# Patient Record
Sex: FEMALE | Race: WHITE | NOT HISPANIC OR LATINO | Employment: OTHER | URBAN - METROPOLITAN AREA
[De-identification: names, ages, dates, MRNs, and addresses within clinical notes are randomized per-mention and may not be internally consistent; named-entity substitution may affect disease eponyms.]

---

## 2021-01-22 ENCOUNTER — TELEPHONE (OUTPATIENT)
Dept: ENDOCRINOLOGY | Facility: CLINIC | Age: 51
End: 2021-01-22

## 2021-01-22 NOTE — TELEPHONE ENCOUNTER
Approvedon January 21  Your PA request has been approved. Additional information will be provided in the approval communication. (Message 1143)  Drug  Synthroid 50MCG tablets

## 2021-02-17 ENCOUNTER — LAB (OUTPATIENT)
Dept: LAB | Facility: HOSPITAL | Age: 51
End: 2021-02-17

## 2021-02-17 ENCOUNTER — OFFICE VISIT (OUTPATIENT)
Dept: ENDOCRINOLOGY | Facility: CLINIC | Age: 51
End: 2021-02-17

## 2021-02-17 VITALS
HEIGHT: 62 IN | WEIGHT: 111 LBS | RESPIRATION RATE: 20 BRPM | HEART RATE: 81 BPM | BODY MASS INDEX: 20.43 KG/M2 | TEMPERATURE: 98 F | OXYGEN SATURATION: 97 % | SYSTOLIC BLOOD PRESSURE: 110 MMHG | DIASTOLIC BLOOD PRESSURE: 62 MMHG

## 2021-02-17 DIAGNOSIS — E03.9 HYPOTHYROIDISM (ACQUIRED): ICD-10-CM

## 2021-02-17 DIAGNOSIS — E04.9 NODULAR GOITER: Primary | ICD-10-CM

## 2021-02-17 LAB — TSH SERPL DL<=0.05 MIU/L-ACNC: 1.58 UIU/ML (ref 0.27–4.2)

## 2021-02-17 PROCEDURE — 84443 ASSAY THYROID STIM HORMONE: CPT

## 2021-02-17 PROCEDURE — 99213 OFFICE O/P EST LOW 20 MIN: CPT | Performed by: INTERNAL MEDICINE

## 2021-02-17 RX ORDER — LEVOTHYROXINE SODIUM 50 MCG
50 TABLET ORAL DAILY
Qty: 90 TABLET | Refills: 3 | Status: SHIPPED | OUTPATIENT
Start: 2021-02-17 | End: 2022-02-21 | Stop reason: SDUPTHER

## 2021-02-17 RX ORDER — LEVOTHYROXINE SODIUM 50 MCG
50 TABLET ORAL DAILY
COMMUNITY
Start: 2020-12-04 | End: 2021-02-17 | Stop reason: SDUPTHER

## 2021-02-17 NOTE — PROGRESS NOTES
"     Office Note      Date: 2021  Patient Name: Concetta Davey  MRN: [id number]  : 1970    Chief Complaint   Patient presents with   • Hypothyroidism   • Goiter       History of Present Illness:   Concetat Davey is a 50 y.o. female who presents for Hypothyroidism and Goiter    She remains on synthroid 50mcg qd. She is taking this correctly. She isn't taking any interfering meds concurrently. She hasn't noted any change in the size of her neck. She notes occ trouble swallowing, especially with meat. She has noted occ hoarseness. She denies any sxs of hypo- or hyperthyroidism.     Nodular goiter - benign FNA ; 10/16    Subjective      Review of Systems:   Review of Systems   Constitutional: Negative.    HENT: Positive for trouble swallowing.    Cardiovascular: Negative.    Gastrointestinal: Negative.    Endocrine: Negative.        The following portions of the patient's history were reviewed and updated as appropriate: allergies, current medications, past family history, past medical history, past social history, past surgical history and problem list.    Objective     Visit Vitals  /62   Pulse 81   Temp 98 °F (36.7 °C)   Resp 20   Ht 157.5 cm (62\")   Wt 50.3 kg (111 lb)   SpO2 97%   BMI 20.30 kg/m²       Physical Exam:  Physical Exam  Constitutional:       Appearance: Normal appearance.   Neck:      Thyroid: Thyroid mass and thyromegaly present. No thyroid tenderness.      Comments: ~4cm enlargement in right thyroid lobe - freely movable  Lymphadenopathy:      Cervical: No cervical adenopathy.   Neurological:      Mental Status: She is alert.         Labs:    TSH  No results found for: TSHBASE     Free T4  No results found for: FREET4    T3  No results found for: A8CNRWT      TPO  No results found for: THYROIDAB    TG AB  No results found for: THGAB    TG  No results found for: THYROGLB    CBC w/DIFF  No results found for: WBC, RBC, HGB, HCT, MCV, MCH, MCHC, RDW, RDWSD, MPV, PLT, NEUTRORELPCT, " LYMPHORELPCT, MONORELPCT, EOSRELPCT, BASORELPCT, AUTOIGPER, NEUTROABS, LYMPHSABS, MONOSABS, EOSABS, BASOSABS, AUTOIGNUM, NRBC        Assessment / Plan      Assessment & Plan:  Diagnoses and all orders for this visit:    1. Nodular goiter (Primary)  Assessment & Plan:  Goiter stable to palpation.  Plan for neck u/s in a year.      2. Hypothyroidism (acquired)  Assessment & Plan:  Check TSH.    Orders:  -     TSH; Future    Other orders  -     Synthroid 50 MCG tablet; Take 1 tablet by mouth Daily.  Dispense: 90 tablet; Refill: 3      Return in about 1 year (around 2/17/2022) for Recheck with TSH and neck u/s.    Armin Maya MD   02/17/2021

## 2022-02-21 ENCOUNTER — LAB (OUTPATIENT)
Dept: LAB | Facility: HOSPITAL | Age: 52
End: 2022-02-21

## 2022-02-21 ENCOUNTER — OFFICE VISIT (OUTPATIENT)
Dept: ENDOCRINOLOGY | Facility: CLINIC | Age: 52
End: 2022-02-21

## 2022-02-21 VITALS
HEIGHT: 62 IN | BODY MASS INDEX: 20.06 KG/M2 | DIASTOLIC BLOOD PRESSURE: 75 MMHG | WEIGHT: 109 LBS | SYSTOLIC BLOOD PRESSURE: 114 MMHG | HEART RATE: 91 BPM | OXYGEN SATURATION: 97 %

## 2022-02-21 DIAGNOSIS — E03.9 HYPOTHYROIDISM (ACQUIRED): ICD-10-CM

## 2022-02-21 DIAGNOSIS — E04.9 NODULAR GOITER: ICD-10-CM

## 2022-02-21 DIAGNOSIS — E03.9 HYPOTHYROIDISM (ACQUIRED): Primary | ICD-10-CM

## 2022-02-21 PROCEDURE — 84443 ASSAY THYROID STIM HORMONE: CPT

## 2022-02-21 PROCEDURE — 99213 OFFICE O/P EST LOW 20 MIN: CPT | Performed by: INTERNAL MEDICINE

## 2022-02-21 PROCEDURE — 76536 US EXAM OF HEAD AND NECK: CPT | Performed by: INTERNAL MEDICINE

## 2022-02-21 RX ORDER — LEVOTHYROXINE SODIUM 50 MCG
50 TABLET ORAL DAILY
Qty: 90 TABLET | Refills: 3 | Status: SHIPPED | OUTPATIENT
Start: 2022-02-21 | End: 2023-03-13 | Stop reason: SDUPTHER

## 2022-02-21 NOTE — ASSESSMENT & PLAN NOTE
A neck u/s was performed today.  This revealed a large mixed cystic-solid nodule in the right thyroid lobe that was 3.8cm in size.  There was a subcentimeter solid hypoechoic nodule in the left lobe that was 0.8cm.  These are stable compared to u/s from 2/18/2020.  No abnormal lymph nodes were seen.    Plan for another u/s in 2 years.

## 2022-02-21 NOTE — PROGRESS NOTES
"     Office Note      Date: 2022  Patient Name: Concetta Davey  MRN: [id number]  : 1970    Chief Complaint   Patient presents with   • Goiter       History of Present Illness:   Concetta Davey is a 51 y.o. female who presents for Goiter    She remains on synthroid 50mcg qd. She is taking this correctly. She isn't taking any interfering meds concurrently. She hasn't noted any change in the size of her neck. She notes occ trouble swallowing, especially with meat. She has noted occ hoarseness. She denies any sxs of hypo- or hyperthyroidism.      Nodular goiter - benign FNA ; 10/16    Subjective      Review of Systems:   Review of Systems   Constitutional: Negative.    Cardiovascular: Negative.    Gastrointestinal: Negative.    Endocrine: Negative.        The following portions of the patient's history were reviewed and updated as appropriate: allergies, current medications, past family history, past medical history, past social history, past surgical history and problem list.    Objective     Visit Vitals  /75   Pulse 91   Ht 157.5 cm (62\")   Wt 49.4 kg (109 lb)   SpO2 97%   BMI 19.94 kg/m²       Physical Exam:  Physical Exam  Constitutional:       Appearance: Normal appearance.   Neurological:      Mental Status: She is alert.         Labs:    TSH  No results found for: TSHBASE     Free T4  No results found for: FREET4    T3  No results found for: [id number]      TPO  No results found for: THYROIDAB    TG AB  No results found for: THGAB    TG  No results found for: THYROGLB    CBC w/DIFF  No results found for: WBC, RBC, HGB, HCT, MCV, MCH, MCHC, RDW, RDWSD, MPV, PLT, NEUTRORELPCT, LYMPHORELPCT, MONORELPCT, EOSRELPCT, BASORELPCT, AUTOIGPER, NEUTROABS, LYMPHSABS, MONOSABS, EOSABS, BASOSABS, AUTOIGNUM, NRBC        Assessment / Plan      Assessment & Plan:  Diagnoses and all orders for this visit:    1. Hypothyroidism (acquired) (Primary)  Assessment & Plan:  Continue T4.  Check TSH today.    Orders:  - "     TSH; Future    2. Nodular goiter  Assessment & Plan:  A neck u/s was performed today.  This revealed a large mixed cystic-solid nodule in the right thyroid lobe that was 3.8cm in size.  There was a subcentimeter solid hypoechoic nodule in the left lobe that was 0.8cm.  These are stable compared to u/s from 2/18/2020.  No abnormal lymph nodes were seen.    Plan for another u/s in 2 years.    Orders:  -     US Thyroid    Other orders  -     Synthroid 50 MCG tablet; Take 1 tablet by mouth Daily.  Dispense: 90 tablet; Refill: 3      Return in about 1 year (around 2/21/2023) for Recheck with TSH.    Armin Maya MD   02/21/2022

## 2022-02-22 LAB — TSH SERPL DL<=0.05 MIU/L-ACNC: 1.09 UIU/ML (ref 0.27–4.2)

## 2023-03-13 RX ORDER — LEVOTHYROXINE SODIUM 50 MCG
50 TABLET ORAL DAILY
Qty: 90 TABLET | Refills: 3 | Status: SHIPPED | OUTPATIENT
Start: 2023-03-13

## 2023-09-12 ENCOUNTER — OFFICE VISIT (OUTPATIENT)
Dept: FAMILY MEDICINE CLINIC | Facility: CLINIC | Age: 53
End: 2023-09-12
Payer: COMMERCIAL

## 2023-09-12 VITALS
WEIGHT: 109.2 LBS | HEIGHT: 62 IN | BODY MASS INDEX: 20.09 KG/M2 | DIASTOLIC BLOOD PRESSURE: 68 MMHG | RESPIRATION RATE: 15 BRPM | SYSTOLIC BLOOD PRESSURE: 110 MMHG | TEMPERATURE: 98 F | HEART RATE: 83 BPM | OXYGEN SATURATION: 99 %

## 2023-09-12 DIAGNOSIS — M54.2 CERVICALGIA: ICD-10-CM

## 2023-09-12 DIAGNOSIS — Z12.2 ENCOUNTER FOR SCREENING FOR LUNG CANCER: ICD-10-CM

## 2023-09-12 DIAGNOSIS — C81.98 HODGKIN LYMPHOMA OF LYMPH NODES OF MULTIPLE REGIONS, UNSPECIFIED HODGKIN LYMPHOMA TYPE: ICD-10-CM

## 2023-09-12 DIAGNOSIS — E04.9 NODULAR GOITER: ICD-10-CM

## 2023-09-12 DIAGNOSIS — Z87.891 PERSONAL HISTORY OF TOBACCO USE: ICD-10-CM

## 2023-09-12 DIAGNOSIS — E03.9 HYPOTHYROIDISM (ACQUIRED): ICD-10-CM

## 2023-09-12 DIAGNOSIS — Z12.31 SCREENING MAMMOGRAM FOR BREAST CANCER: ICD-10-CM

## 2023-09-12 DIAGNOSIS — E55.9 VITAMIN D DEFICIENCY: ICD-10-CM

## 2023-09-12 DIAGNOSIS — C81.91 HODGKIN LYMPHOMA OF LYMPH NODES OF NECK, UNSPECIFIED HODGKIN LYMPHOMA TYPE: ICD-10-CM

## 2023-09-12 DIAGNOSIS — Z12.11 SCREENING FOR COLON CANCER: ICD-10-CM

## 2023-09-12 DIAGNOSIS — G24.8 ABDOMINAL WALL DYSTONIA: ICD-10-CM

## 2023-09-12 DIAGNOSIS — Z13.1 DIABETES MELLITUS SCREENING: ICD-10-CM

## 2023-09-12 DIAGNOSIS — Z00.00 GENERAL MEDICAL EXAM: Primary | ICD-10-CM

## 2023-09-12 NOTE — ASSESSMENT & PLAN NOTE
Discussed together health maintenance and screening along with vaccination options and healthy diet and exercise habits as part of the preventative counseling at their physical exam today.     Declines all vaccination updates.  We did discuss flu, COVID, Tdap, Shingrix, and Prevnar 20.    Discussed and encouraged low-dose lung CT.  She voiced understanding and declines.  We will continue to encourage low-dose lung CT and smoking cessation in the future.  She declines smoking cessation.    Up-to-date with Pap smear and mammogram.    Awaiting nonfasting labs.

## 2023-09-12 NOTE — ASSESSMENT & PLAN NOTE
Continue Synthroid.  Awaiting recheck on thyroid studies.  She has a follow-up with endocrinology next week

## 2023-09-12 NOTE — LETTER
September 12, 2023     Patient: Concetta Davey   YOB: 1970   Date of Visit: 9/12/2023       Dx chronic neck pain and limitation with leftward movement    Order for physical therapy up to 3 times/week for 6 wks.  Please evaluate and treat.          Sincerely,        Mesfin Claros MD

## 2023-09-12 NOTE — ASSESSMENT & PLAN NOTE
Released after 20 years of surveillance with oncology.  Encouraged smoking cessation.  Encourage low-dose lung CT.  Awaiting nonfasting labs

## 2023-09-12 NOTE — PROGRESS NOTES
Chief Complaint  Follow-up (Patient is here for blood work and neck pain )    Subjective    History of Present Illness:  Concetta Davey is a 53 y.o. female who presents today for physical exam and to reestablish care together.    It has been over 3 years since her last visit and she is interested in getting blood work up-to-date.  Specifically she would like CBC done given her history of Hodgkin's lymphoma.  She did follow yearly with oncology for over 20 years and he released her as long as she would continue to get a yearly CBC completed.    She is up-to-date with [facility] with her mammogram in September 2022 and is scheduled in October 2023 for her yearly mammogram.  Pap smear up-to-date with gynecology in May 2023.    Follow-up ongoing with endocrinology given history of nodular goiter on the right side of her thyroid and hypothyroidism.  She would like thyroid labs done today given she has an appointment with endocrinology scheduled for next week.    She does continue to see pain management after Dr. Bethea left [facility] gastroenterology and is getting abdominal wall injections with Botox given problems with abdominal wall dystonia.    She is past due for colon cancer screening and is willing to get Cologuard done but declines colonoscopy.    Discussed and recommended lung cancer screening given her 32-pack-year history of smoking.  She voiced understanding but at this time declines all cancer screening.    She is having recurrent problems with neck pain and has had further work-up and treatment with pain management and physical therapy in the past.  She is interested in getting a neck x-ray up-to-date and also in order to restart physical therapy.    History of low vitamin D and she would like vitamin D level checked with her labs today.        Objective   Vital Signs:   /68 (BP Location: Right arm, Patient Position: Sitting, Cuff Size: Adult)   Pulse 83   Temp 98 °F (36.7 °C)  "(Temporal)   Resp 15   Ht 157.5 cm (62\")   Wt 49.5 kg (109 lb 3.2 oz)   SpO2 99%   BMI 19.97 kg/m²     Review of Systems   Constitutional:  Negative for appetite change, chills and fever.   HENT:  Negative for hearing loss.    Eyes:  Negative for blurred vision.   Respiratory:  Negative for chest tightness.    Cardiovascular:  Negative for chest pain.   Gastrointestinal:  Positive for abdominal pain.        See HPI.  Well-controlled with abdominal Botox injections   Musculoskeletal:  Positive for arthralgias, neck pain and neck stiffness. Negative for gait problem.   Skin:  Negative for rash.   Psychiatric/Behavioral:  Negative for depressed mood.      Past History:  Medical History: has a past medical history of Non-toxic multinodular goiter and Postoperative hypothyroidism.   Surgical History: has no past surgical history on file.   Family History: family history includes Arthritis in her mother; Atrial fibrillation in her mother; COPD in her father; Hypertension in her father and mother.   Social History: reports that she has been smoking cigarettes. She has a 32.00 pack-year smoking history. She has never used smokeless tobacco. She reports that she does not drink alcohol and does not use drugs.      Current Outpatient Medications:     Cholecalciferol (Vitamin D3) 25 MCG (1000 UT) capsule, Take 1 capsule by mouth Daily., Disp: , Rfl:     Synthroid 50 MCG tablet, Take 1 tablet by mouth Daily., Disp: 90 tablet, Rfl: 3    Allergies: Contrast dye (echo or unknown ct/mr) and Flagyl [metronidazole]    Physical Exam  Constitutional:       Appearance: Normal appearance.   HENT:      Head: Normocephalic.      Right Ear: Tympanic membrane, ear canal and external ear normal. There is no impacted cerumen.      Left Ear: Tympanic membrane, ear canal and external ear normal. There is no impacted cerumen.      Nose: Nose normal.      Mouth/Throat:      Mouth: Mucous membranes are moist.      Pharynx: Oropharynx is clear. "   Eyes:      Extraocular Movements: Extraocular movements intact.      Conjunctiva/sclera: Conjunctivae normal.      Pupils: Pupils are equal, round, and reactive to light.   Neck:      Comments: Right thyroid enlargement consistent with her history of nodular goiter.  Ongoing follow-up with endocrinology and they do continue to monitor with ultrasound imaging given her history of Hodgkin's lymphoma  Cardiovascular:      Rate and Rhythm: Normal rate and regular rhythm.      Heart sounds: Normal heart sounds.   Pulmonary:      Effort: Pulmonary effort is normal.      Breath sounds: Normal breath sounds.   Abdominal:      General: Abdomen is flat. Bowel sounds are normal. There is no distension.      Palpations: Abdomen is soft. There is no mass.      Tenderness: There is no abdominal tenderness.   Musculoskeletal:      Comments: Mild cervical crepitus and decreased left forward movement.  Interested in x-ray and physical therapy order   Lymphadenopathy:      Cervical: No cervical adenopathy.   Skin:     General: Skin is warm and dry.   Neurological:      General: No focal deficit present.      Mental Status: She is alert.   Psychiatric:         Mood and Affect: Mood normal.         Behavior: Behavior normal.         Thought Content: Thought content normal.        Result Review                   Assessment and Plan  Diagnoses and all orders for this visit:    1. General medical exam (Primary)  Assessment & Plan:  Discussed together health maintenance and screening along with vaccination options and healthy diet and exercise habits as part of the preventative counseling at their physical exam today.     Declines all vaccination updates.  We did discuss flu, COVID, Tdap, Shingrix, and Prevnar 20.    Discussed and encouraged low-dose lung CT.  She voiced understanding and declines.  We will continue to encourage low-dose lung CT and smoking cessation in the future.  She declines smoking cessation.    Up-to-date with Pap  smear and mammogram.    Awaiting nonfasting labs.    Orders:  -     CBC Auto Differential; Future  -     Comprehensive Metabolic Panel; Future  -     Lipid Panel; Future  -     TSH; Future  -     T4, Free; Future  -     CBC Auto Differential  -     Comprehensive Metabolic Panel  -     Lipid Panel  -     TSH  -     T4, Free    2. Diabetes mellitus screening  -     Hemoglobin A1c; Future  -     Hemoglobin A1c    3. Hodgkin lymphoma of lymph nodes of neck, unspecified Hodgkin lymphoma type  Assessment & Plan:  Released after 20 years of surveillance with oncology.  Encouraged smoking cessation.  Encourage low-dose lung CT.  Awaiting nonfasting labs    Orders:  -     CBC Auto Differential; Future  -     Comprehensive Metabolic Panel; Future  -     CBC Auto Differential  -     Comprehensive Metabolic Panel    4. Screening mammogram for breast cancer  Assessment & Plan:  Uptodate sept 2022 at [facility].  Scheduled 10/1/23 with [facility].       5. Screening for colon cancer  Assessment & Plan:  Declines colonoscopy.  Ordered Cologuard    Orders:  -     Cologuard - Stool, Per Rectum; Future    6. Abdominal wall dystonia  Assessment & Plan:  Botox injections with pain management at [facility]      7. Vitamin D deficiency  -     Vitamin D,25-Hydroxy; Future  -     Vitamin D,25-Hydroxy    8. Hypothyroidism (acquired)  Assessment & Plan:  Continue Synthroid.  Awaiting recheck on thyroid studies.  She has a follow-up with endocrinology next week    Orders:  -     CBC Auto Differential; Future  -     Comprehensive Metabolic Panel; Future  -     TSH; Future  -     T4, Free; Future  -     CBC Auto Differential  -     Comprehensive Metabolic Panel  -     TSH  -     T4, Free    9. Cervicalgia  Assessment & Plan:  Awaiting neck x-ray and given order to start physical therapy at [facility]    Orders:  -     XR Spine Cervical 2 or 3 View; Future    10. Personal history of tobacco use  Assessment & Plan:  Declines smoking  cessation.    Discussed and encouraged low-dose lung CT for lung cancer screening.  She voiced understanding and declines low-dose lung CT at this time      11. Encounter for screening for lung cancer  Assessment & Plan:  See above.  We will encourage this at follow-up      12. Nodular goiter  Assessment & Plan:  Follow-up ongoing with endocrinology      13. Hodgkin lymphoma of lymph nodes of multiple regions, unspecified Hodgkin lymphoma type  Assessment & Plan:  See above          BMI is within normal parameters. No other follow-up for BMI required.          Follow Up  Return in about 1 year (around 9/12/2024) for Annual physical, Fasting for labs at appointment (but drink water!).    Mesfin Claros MD

## 2023-09-12 NOTE — ASSESSMENT & PLAN NOTE
Declines smoking cessation.    Discussed and encouraged low-dose lung CT for lung cancer screening.  She voiced understanding and declines low-dose lung CT at this time

## 2023-09-13 LAB
25(OH)D3+25(OH)D2 SERPL-MCNC: 23.9 NG/ML (ref 30–100)
ALBUMIN SERPL-MCNC: 4.4 G/DL (ref 3.8–4.9)
ALBUMIN/GLOB SERPL: 1.6 {RATIO} (ref 1.2–2.2)
ALP SERPL-CCNC: 77 IU/L (ref 44–121)
ALT SERPL-CCNC: 7 IU/L (ref 0–32)
AST SERPL-CCNC: 12 IU/L (ref 0–40)
BASOPHILS # BLD AUTO: 0.1 X10E3/UL (ref 0–0.2)
BASOPHILS NFR BLD AUTO: 1 %
BILIRUB SERPL-MCNC: 0.3 MG/DL (ref 0–1.2)
BUN SERPL-MCNC: 14 MG/DL (ref 6–24)
BUN/CREAT SERPL: 18 (ref 9–23)
CALCIUM SERPL-MCNC: 9.1 MG/DL (ref 8.7–10.2)
CHLORIDE SERPL-SCNC: 103 MMOL/L (ref 96–106)
CHOLEST SERPL-MCNC: 204 MG/DL (ref 100–199)
CO2 SERPL-SCNC: 24 MMOL/L (ref 20–29)
CREAT SERPL-MCNC: 0.78 MG/DL (ref 0.57–1)
EGFRCR SERPLBLD CKD-EPI 2021: 91 ML/MIN/1.73
EOSINOPHIL # BLD AUTO: 0.2 X10E3/UL (ref 0–0.4)
EOSINOPHIL NFR BLD AUTO: 3 %
ERYTHROCYTE [DISTWIDTH] IN BLOOD BY AUTOMATED COUNT: 13.9 % (ref 11.7–15.4)
GLOBULIN SER CALC-MCNC: 2.8 G/DL (ref 1.5–4.5)
GLUCOSE SERPL-MCNC: 85 MG/DL (ref 70–99)
HBA1C MFR BLD: 5.6 % (ref 4.8–5.6)
HCT VFR BLD AUTO: 39.2 % (ref 34–46.6)
HDLC SERPL-MCNC: 78 MG/DL
HGB BLD-MCNC: 13 G/DL (ref 11.1–15.9)
IMM GRANULOCYTES # BLD AUTO: 0 X10E3/UL (ref 0–0.1)
IMM GRANULOCYTES NFR BLD AUTO: 0 %
LDLC SERPL CALC-MCNC: 107 MG/DL (ref 0–99)
LYMPHOCYTES # BLD AUTO: 2.2 X10E3/UL (ref 0.7–3.1)
LYMPHOCYTES NFR BLD AUTO: 27 %
MCH RBC QN AUTO: 29.8 PG (ref 26.6–33)
MCHC RBC AUTO-ENTMCNC: 33.2 G/DL (ref 31.5–35.7)
MCV RBC AUTO: 90 FL (ref 79–97)
MONOCYTES # BLD AUTO: 0.6 X10E3/UL (ref 0.1–0.9)
MONOCYTES NFR BLD AUTO: 8 %
NEUTROPHILS # BLD AUTO: 5.1 X10E3/UL (ref 1.4–7)
NEUTROPHILS NFR BLD AUTO: 61 %
PLATELET # BLD AUTO: 348 X10E3/UL (ref 150–450)
POTASSIUM SERPL-SCNC: 4.1 MMOL/L (ref 3.5–5.2)
PROT SERPL-MCNC: 7.2 G/DL (ref 6–8.5)
RBC # BLD AUTO: 4.36 X10E6/UL (ref 3.77–5.28)
SODIUM SERPL-SCNC: 142 MMOL/L (ref 134–144)
T4 FREE SERPL-MCNC: 1.26 NG/DL (ref 0.82–1.77)
TRIGL SERPL-MCNC: 107 MG/DL (ref 0–149)
TSH SERPL DL<=0.005 MIU/L-ACNC: 0.98 UIU/ML (ref 0.45–4.5)
VLDLC SERPL CALC-MCNC: 19 MG/DL (ref 5–40)
WBC # BLD AUTO: 8.3 X10E3/UL (ref 3.4–10.8)

## 2023-09-21 ENCOUNTER — OFFICE VISIT (OUTPATIENT)
Dept: ENDOCRINOLOGY | Facility: CLINIC | Age: 53
End: 2023-09-21
Payer: COMMERCIAL

## 2023-09-21 VITALS
OXYGEN SATURATION: 99 % | HEIGHT: 62 IN | DIASTOLIC BLOOD PRESSURE: 66 MMHG | BODY MASS INDEX: 19.88 KG/M2 | HEART RATE: 80 BPM | WEIGHT: 108 LBS | SYSTOLIC BLOOD PRESSURE: 112 MMHG

## 2023-09-21 DIAGNOSIS — E04.9 NODULAR GOITER: ICD-10-CM

## 2023-09-21 DIAGNOSIS — E03.9 HYPOTHYROIDISM (ACQUIRED): Primary | ICD-10-CM

## 2023-09-21 PROCEDURE — 99213 OFFICE O/P EST LOW 20 MIN: CPT | Performed by: INTERNAL MEDICINE

## 2023-09-21 NOTE — PROGRESS NOTES
"     Office Note      Date: 2023  Patient Name: Concetta Davey  MRN: [id number]  : 1970    Chief Complaint   Patient presents with    Hypothyroidism       History of Present Illness:   Concetta Davey is a 53 y.o. female who presents for Hypothyroidism    She remains on synthroid 50mcg qd. She is taking this correctly. She isn't taking any interfering meds concurrently. She hasn't noted any change in the size of her neck. She notes occ trouble swallowing, especially with meat. She has noted occ hoarseness. She denies any sxs of hypo- or hyperthyroidism.      Nodular goiter - benign FNA ; 10/16    Subjective      Review of Systems:   Review of Systems   Constitutional: Negative.    Cardiovascular: Negative.    Gastrointestinal: Negative.    Endocrine: Negative.      The following portions of the patient's history were reviewed and updated as appropriate: allergies, current medications, past family history, past medical history, past social history, past surgical history, and problem list.    Objective     Visit Vitals  /66   Pulse 80   Ht 157.5 cm (62\")   Wt 49 kg (108 lb)   SpO2 99%   BMI 19.75 kg/m²       Physical Exam:  Physical Exam  Constitutional:       Appearance: Normal appearance.   Neck:      Thyroid: Thyroid mass and thyromegaly present. No thyroid tenderness.      Comments: 4cm enlargement of right thyroid lobe - freely movable  Lymphadenopathy:      Cervical: No cervical adenopathy.   Neurological:      Mental Status: She is alert.       Labs:    TSH  No results found for: TSHBASE     Free T4  Free T4   Date Value Ref Range Status   2023 1.26 0.82 - 1.77 ng/dL Final       T3  No results found for: [id number]      TPO  No results found for: THYROIDAB    TG AB  No results found for: THGAB    TG  No results found for: THYROGLB    CBC w/DIFF  Lab Results   Component Value Date    WBC 8.3 2023    RBC 4.36 2023    HGB 13.0 2023    HCT 39.2 2023    MCV 90 " 09/12/2023    MCH 29.8 09/12/2023    MCHC 33.2 09/12/2023    RDW 13.9 09/12/2023     09/12/2023    NEUTRORELPCT 61 09/12/2023    LYMPHORELPCT 27 09/12/2023    MONORELPCT 8 09/12/2023    EOSRELPCT 3 09/12/2023    BASORELPCT 1 09/12/2023    NEUTROABS 5.1 09/12/2023    LYMPHSABS 2.2 09/12/2023    MONOSABS 0.6 09/12/2023    EOSABS 0.2 09/12/2023    BASOSABS 0.1 09/12/2023           Assessment / Plan      Assessment & Plan:  Diagnoses and all orders for this visit:    1. Hypothyroidism (acquired) (Primary)  Assessment & Plan:  Continue T4 tx.  Recent TSH was normal.      2. Nodular goiter  Assessment & Plan:  Plan for neck u/s in about 6 months.        Current Outpatient Medications   Medication Instructions    Synthroid 50 mcg, Oral, Daily    Vitamin D3 4,000 Units, Oral, Daily      Return in about 6 months (around 3/21/2024) for Recheck with TSH, neck u/s.    Armin Maya MD   09/21/2023

## 2024-03-04 RX ORDER — LEVOTHYROXINE SODIUM 50 MCG
50 TABLET ORAL DAILY
Qty: 90 TABLET | Refills: 3 | Status: SHIPPED | OUTPATIENT
Start: 2024-03-04

## 2024-03-04 NOTE — TELEPHONE ENCOUNTER
Rx Refill Note  Requested Prescriptions     Pending Prescriptions Disp Refills    Synthroid 50 MCG tablet 90 tablet 3     Sig: Take 1 tablet by mouth Daily.      Last office visit with prescribing clinician: 9/21/2023     Next office visit with prescribing clinician: 3/26/2024       Yareli Alicea MA  03/04/24, 13:44 EST

## 2024-03-26 ENCOUNTER — OFFICE VISIT (OUTPATIENT)
Dept: ENDOCRINOLOGY | Facility: CLINIC | Age: 54
End: 2024-03-26
Payer: COMMERCIAL

## 2024-03-26 VITALS
DIASTOLIC BLOOD PRESSURE: 60 MMHG | SYSTOLIC BLOOD PRESSURE: 106 MMHG | OXYGEN SATURATION: 97 % | HEART RATE: 90 BPM | HEIGHT: 62 IN | BODY MASS INDEX: 20.87 KG/M2 | WEIGHT: 113.4 LBS

## 2024-03-26 DIAGNOSIS — E03.9 HYPOTHYROIDISM (ACQUIRED): Primary | ICD-10-CM

## 2024-03-26 DIAGNOSIS — E04.9 NODULAR GOITER: ICD-10-CM

## 2024-03-26 DIAGNOSIS — E55.9 VITAMIN D DEFICIENCY: ICD-10-CM

## 2024-03-26 PROCEDURE — 76536 US EXAM OF HEAD AND NECK: CPT | Performed by: INTERNAL MEDICINE

## 2024-03-26 PROCEDURE — 84443 ASSAY THYROID STIM HORMONE: CPT | Performed by: INTERNAL MEDICINE

## 2024-03-26 PROCEDURE — 99214 OFFICE O/P EST MOD 30 MIN: CPT | Performed by: INTERNAL MEDICINE

## 2024-03-26 PROCEDURE — 82306 VITAMIN D 25 HYDROXY: CPT | Performed by: INTERNAL MEDICINE

## 2024-03-26 RX ORDER — CHOLECALCIFEROL (VITAMIN D3) 125 MCG
4000 CAPSULE ORAL DAILY
COMMUNITY

## 2024-03-26 NOTE — PROGRESS NOTES
"     Office Note      Date: 2024  Patient Name: Concetta Davey  MRN: [id number]  : 1970    Chief Complaint   Patient presents with    Hypothyroidism    Goiter     Ultrasound       History of Present Illness:   Concetta Davey is a 53 y.o. female who presents for Hypothyroidism and Goiter (Ultrasound)    She remains on synthroid 50mcg qd. She is taking this correctly. She isn't taking any interfering meds concurrently. She hasn't noted any change in the size of her neck. She notes occ trouble swallowing, especially with meat. She has noted occ hoarseness. She denies any sxs of hypo- or hyperthyroidism.      Nodular goiter - benign FNA ; 10/16    Subjective      Review of Systems:   Review of Systems   Constitutional: Negative.    Cardiovascular: Negative.    Gastrointestinal: Negative.    Endocrine: Negative.        The following portions of the patient's history were reviewed and updated as appropriate: allergies, current medications, past family history, past medical history, past social history, past surgical history, and problem list.    Objective     Visit Vitals  /60 (BP Location: Right arm, Patient Position: Sitting, Cuff Size: Adult)   Pulse 90   Ht 157.5 cm (62\")   Wt 51.4 kg (113 lb 6.4 oz)   SpO2 97%   BMI 20.74 kg/m²       Physical Exam:  Physical Exam  Constitutional:       Appearance: Normal appearance.   Neurological:      Mental Status: She is alert.         Labs:    TSH  No results found for: \"TSHBASE\"     Free T4  Free T4   Date Value Ref Range Status   2023 1.26 0.82 - 1.77 ng/dL Final       T3  No results found for: \"Y6CKAJE\"      TPO  No results found for: \"THYROIDAB\"    TG AB  No results found for: \"THGAB\"    TG  No results found for: \"THYROGLB\"    CBC w/DIFF  Lab Results   Component Value Date    WBC 8.3 2023    RBC 4.36 2023    HGB 13.0 2023    HCT 39.2 2023    MCV 90 2023    MCH 29.8 2023    MCHC 33.2 2023    RDW 13.9 " 09/12/2023     09/12/2023    NEUTRORELPCT 61 09/12/2023    LYMPHORELPCT 27 09/12/2023    MONORELPCT 8 09/12/2023    EOSRELPCT 3 09/12/2023    BASORELPCT 1 09/12/2023    NEUTROABS 5.1 09/12/2023    LYMPHSABS 2.2 09/12/2023    MONOSABS 0.6 09/12/2023    EOSABS 0.2 09/12/2023    BASOSABS 0.1 09/12/2023           Assessment / Plan      Assessment & Plan:  Diagnoses and all orders for this visit:    1. Hypothyroidism (acquired) (Primary)  Assessment & Plan:  Continue Synthroid.  Check TSH.    Orders:  -     TSH; Future    2. Nodular goiter  Assessment & Plan:  A neck u/s was performed today.  This revealed a mixed cystic-solid nodule in the right thyroid lobe.  It measured 3.8cm.  There was a small hypoechoic nodule in the left lobe.  It measured 1cm.  There was a subcentimeter cyst in the left lobe also.  No abnormal lymph nodes were seen.  Compared to u/s from 2/21/2022, these appear stable.      Plan for another u/s in 2 years.    Orders:  -     US Thyroid    3. Vitamin D deficiency  Assessment & Plan:  She reports the vit D dose was increased to 4000 IU in September.  She asks that we recheck vit D today.    Orders:  -     Vitamin D,25-Hydroxy; Future      Current Outpatient Medications   Medication Instructions    Synthroid 50 mcg, Oral, Daily    Vitamin D3 4,000 Units, Oral, Daily      Return in about 1 year (around 3/26/2025) for Recheck with TSH.    Electronically signed by: Armin Maya MD  03/26/2024

## 2024-03-26 NOTE — ASSESSMENT & PLAN NOTE
She reports the vit D dose was increased to 4000 IU in September.  She asks that we recheck vit D today.

## 2024-03-26 NOTE — ASSESSMENT & PLAN NOTE
A neck u/s was performed today.  This revealed a mixed cystic-solid nodule in the right thyroid lobe.  It measured 3.8cm.  There was a small hypoechoic nodule in the left lobe.  It measured 1cm.  There was a subcentimeter cyst in the left lobe also.  No abnormal lymph nodes were seen.  Compared to u/s from 2/21/2022, these appear stable.      Plan for another u/s in 2 years.

## 2024-03-27 LAB
25(OH)D3 SERPL-MCNC: 36.1 NG/ML (ref 30–100)
TSH SERPL DL<=0.05 MIU/L-ACNC: 0.66 UIU/ML (ref 0.27–4.2)

## 2024-09-12 ENCOUNTER — OFFICE VISIT (OUTPATIENT)
Dept: FAMILY MEDICINE CLINIC | Facility: CLINIC | Age: 54
End: 2024-09-12
Payer: COMMERCIAL

## 2024-09-12 VITALS
WEIGHT: 107.9 LBS | HEART RATE: 90 BPM | BODY MASS INDEX: 19.85 KG/M2 | OXYGEN SATURATION: 97 % | SYSTOLIC BLOOD PRESSURE: 118 MMHG | DIASTOLIC BLOOD PRESSURE: 86 MMHG | HEIGHT: 62 IN

## 2024-09-12 DIAGNOSIS — E55.9 VITAMIN D DEFICIENCY: ICD-10-CM

## 2024-09-12 DIAGNOSIS — C81.91 HODGKIN LYMPHOMA OF LYMPH NODES OF NECK, UNSPECIFIED HODGKIN LYMPHOMA TYPE: ICD-10-CM

## 2024-09-12 DIAGNOSIS — Z00.00 GENERAL MEDICAL EXAM: Primary | ICD-10-CM

## 2024-09-12 DIAGNOSIS — Z13.1 DIABETES MELLITUS SCREENING: ICD-10-CM

## 2024-09-12 PROCEDURE — 99396 PREV VISIT EST AGE 40-64: CPT | Performed by: FAMILY MEDICINE

## 2024-09-12 NOTE — PROGRESS NOTES
"Chief Complaint  Physical     Subjective    History of Present Illness:  Concetta Davey is a 54 y.o. female who presents today for physical exam     Doing well since her visit to re-establish care and for physical last year.     We do continue to followup with regular CBC yearly given her history of Hodgkin's lymphoma.  She did follow yearly with oncology for over 20 years and he released her as long as she would continue to get a yearly CBC completed.    She is up-to-date with [facility] with her mammogram in October 2023 with follow-up breast ultrasound done last month.  She is planning on a repeat breast ultrasound in 6 months per radiology recommendations.      Pap smear up-to-date with gynecology in May 2023.    Follow-up ongoing with endocrinology given history of nodular goiter on the right side of her thyroid and hypothyroidism.  She would like to wait on thyroid blood work given cost concerns today.  She does have follow-up planned with endocrinology in March    She does continue to see pain management after Dr. Bethea left [facility] gastroenterology and is getting abdominal wall injections with Botox given problems with abdominal wall dystonia.    Declines colonoscopy.  Completed Cologuard for colon cancer screening September 2023 that returned negative.  Plans for repeat Cologuard in 3 years (September 2026).    Discussed and recommended lung cancer screening given her over 30-pack-year history of smoking.  She voiced understanding but at this time declines all cancer screening.  Declines flu vaccination and vaccination of any kind.    History of low vitamin D and she would like vitamin D level checked with her labs today.      Objective   Vital Signs:   /86 (BP Location: Left arm, Patient Position: Sitting, Cuff Size: Adult)   Pulse 90   Ht 157.5 cm (62\")   Wt 48.9 kg (107 lb 14.4 oz)   SpO2 97%   BMI 19.74 kg/m²     Review of Systems   Constitutional:  Positive for fatigue. " Negative for appetite change, chills and fever.   HENT:  Negative for hearing loss.    Eyes:  Negative for blurred vision.   Respiratory:  Negative for chest tightness.    Cardiovascular:  Negative for chest pain.   Gastrointestinal:  Negative for abdominal pain.   Musculoskeletal:  Negative for gait problem.   Skin:  Negative for rash.   Psychiatric/Behavioral:  Negative for depressed mood.        Past History:  Medical History: has a past medical history of Hodgkin's lymphoma (08/2000), Non-toxic multinodular goiter, Postoperative hypothyroidism, and Vitamin D deficiency.   Surgical History: has a past surgical history that includes Gallbladder surgery (2016).   Family History: family history includes Arthritis in her mother, paternal grandfather, and sister; Atrial fibrillation in her mother; COPD in her father; Colon cancer in her maternal aunt; Heart disease in her maternal grandfather; Hypertension in her father, maternal grandfather, maternal grandmother, mother, and paternal grandmother.   Social History: reports that she has been smoking cigarettes. She started smoking about 30 years ago. She has a 30.7 pack-year smoking history. She has never used smokeless tobacco. She reports that she does not drink alcohol and does not use drugs.      Current Outpatient Medications:     Cholecalciferol (Vitamin D3) 50 MCG (2000 UT) tablet, Take 2 tablets by mouth Daily., Disp: , Rfl:     Synthroid 50 MCG tablet, Take 1 tablet by mouth Daily., Disp: 90 tablet, Rfl: 3    Allergies: Contrast dye (echo or unknown ct/mr), Iodine, and Flagyl [metronidazole]    Physical Exam  Constitutional:       Appearance: Normal appearance.   HENT:      Head: Normocephalic.      Right Ear: External ear normal.      Left Ear: External ear normal.      Nose: Nose normal.      Mouth/Throat:      Mouth: Mucous membranes are moist.      Pharynx: Oropharynx is clear.   Eyes:      Extraocular Movements: Extraocular movements intact.       Conjunctiva/sclera: Conjunctivae normal.      Pupils: Pupils are equal, round, and reactive to light.   Cardiovascular:      Rate and Rhythm: Normal rate and regular rhythm.      Heart sounds: Normal heart sounds. No murmur heard.     No friction rub. No gallop.   Pulmonary:      Effort: Pulmonary effort is normal.      Breath sounds: Normal breath sounds.   Musculoskeletal:         General: Normal range of motion.      Cervical back: Normal range of motion.   Skin:     General: Skin is warm and dry.   Neurological:      General: No focal deficit present.      Mental Status: She is alert.   Psychiatric:         Mood and Affect: Mood normal.         Behavior: Behavior normal.         Thought Content: Thought content normal.          Result Review                   Assessment and Plan  Diagnoses and all orders for this visit:    1. General medical exam (Primary)  Assessment & Plan:  Discussed together health maintenance and screening along with vaccination options and healthy diet and exercise habits as part of the preventative counseling at their physical exam today.     Declines all vaccination updates.  We did discuss flu, COVID, Tdap, Shingrix, and Prevnar 20.    Discussed and encouraged low-dose lung CT.  She voiced understanding and declines.  We will continue to encourage low-dose lung CT and smoking cessation in the future.  She declines smoking cessation.    Up-to-date with Pap smear and mammogram.    Awaiting fasting labs.    Orders:  -     CBC Auto Differential; Future  -     Comprehensive Metabolic Panel; Future  -     Lipid Panel; Future  -     CBC Auto Differential  -     Comprehensive Metabolic Panel  -     Lipid Panel    2. Diabetes mellitus screening  -     Hemoglobin A1c; Future  -     Hemoglobin A1c    3. Vitamin D deficiency  Assessment & Plan:  Awaiting recheck on vitamin D with fasting labs    Orders:  -     Vitamin D,25-Hydroxy; Future  -     Vitamin D,25-Hydroxy    4. Hodgkin lymphoma of lymph  nodes of neck, unspecified Hodgkin lymphoma type  Assessment & Plan:  Released after 20 years of surveillance with oncology.  Encouraged smoking cessation.  Encourage low-dose lung CT.  Awaiting labs          BMI is within normal parameters. No other follow-up for BMI required.          Follow Up  Return in about 17 months (around 2/3/2026) for Annual physical.    Mesfin Claros MD

## 2024-09-12 NOTE — ASSESSMENT & PLAN NOTE
Released after 20 years of surveillance with oncology.  Encouraged smoking cessation.  Encourage low-dose lung CT.  Awaiting labs

## 2024-09-12 NOTE — ASSESSMENT & PLAN NOTE
Discussed together health maintenance and screening along with vaccination options and healthy diet and exercise habits as part of the preventative counseling at their physical exam today.     Declines all vaccination updates.  We did discuss flu, COVID, Tdap, Shingrix, and Prevnar 20.    Discussed and encouraged low-dose lung CT.  She voiced understanding and declines.  We will continue to encourage low-dose lung CT and smoking cessation in the future.  She declines smoking cessation.    Up-to-date with Pap smear and mammogram.    Awaiting fasting labs.

## 2024-09-13 LAB
25(OH)D3+25(OH)D2 SERPL-MCNC: 29.8 NG/ML (ref 30–100)
ALBUMIN SERPL-MCNC: 4.8 G/DL (ref 3.8–4.9)
ALP SERPL-CCNC: 91 IU/L (ref 44–121)
ALT SERPL-CCNC: 19 IU/L (ref 0–32)
AST SERPL-CCNC: 22 IU/L (ref 0–40)
BASOPHILS # BLD AUTO: 0.1 X10E3/UL (ref 0–0.2)
BASOPHILS NFR BLD AUTO: 1 %
BILIRUB SERPL-MCNC: 0.5 MG/DL (ref 0–1.2)
BUN SERPL-MCNC: 17 MG/DL (ref 6–24)
BUN/CREAT SERPL: 21 (ref 9–23)
CALCIUM SERPL-MCNC: 9.7 MG/DL (ref 8.7–10.2)
CHLORIDE SERPL-SCNC: 98 MMOL/L (ref 96–106)
CHOLEST SERPL-MCNC: 251 MG/DL (ref 100–199)
CO2 SERPL-SCNC: 23 MMOL/L (ref 20–29)
CREAT SERPL-MCNC: 0.82 MG/DL (ref 0.57–1)
EGFRCR SERPLBLD CKD-EPI 2021: 85 ML/MIN/1.73
EOSINOPHIL # BLD AUTO: 0.4 X10E3/UL (ref 0–0.4)
EOSINOPHIL NFR BLD AUTO: 7 %
ERYTHROCYTE [DISTWIDTH] IN BLOOD BY AUTOMATED COUNT: 13.1 % (ref 11.7–15.4)
GLOBULIN SER CALC-MCNC: 3.6 G/DL (ref 1.5–4.5)
GLUCOSE SERPL-MCNC: 88 MG/DL (ref 70–99)
HBA1C MFR BLD: 5.6 % (ref 4.8–5.6)
HCT VFR BLD AUTO: 49.8 % (ref 34–46.6)
HDLC SERPL-MCNC: 80 MG/DL
HGB BLD-MCNC: 15.8 G/DL (ref 11.1–15.9)
IMM GRANULOCYTES # BLD AUTO: 0 X10E3/UL (ref 0–0.1)
IMM GRANULOCYTES NFR BLD AUTO: 0 %
LDLC SERPL CALC-MCNC: 150 MG/DL (ref 0–99)
LYMPHOCYTES # BLD AUTO: 1.9 X10E3/UL (ref 0.7–3.1)
LYMPHOCYTES NFR BLD AUTO: 33 %
MCH RBC QN AUTO: 29.5 PG (ref 26.6–33)
MCHC RBC AUTO-ENTMCNC: 31.7 G/DL (ref 31.5–35.7)
MCV RBC AUTO: 93 FL (ref 79–97)
MONOCYTES # BLD AUTO: 0.4 X10E3/UL (ref 0.1–0.9)
MONOCYTES NFR BLD AUTO: 7 %
NEUTROPHILS # BLD AUTO: 3 X10E3/UL (ref 1.4–7)
NEUTROPHILS NFR BLD AUTO: 52 %
PLATELET # BLD AUTO: 328 X10E3/UL (ref 150–450)
POTASSIUM SERPL-SCNC: 4.2 MMOL/L (ref 3.5–5.2)
PROT SERPL-MCNC: 8.4 G/DL (ref 6–8.5)
RBC # BLD AUTO: 5.35 X10E6/UL (ref 3.77–5.28)
SODIUM SERPL-SCNC: 138 MMOL/L (ref 134–144)
TRIGL SERPL-MCNC: 122 MG/DL (ref 0–149)
VLDLC SERPL CALC-MCNC: 21 MG/DL (ref 5–40)
WBC # BLD AUTO: 5.7 X10E3/UL (ref 3.4–10.8)

## 2025-02-10 ENCOUNTER — LAB (OUTPATIENT)
Dept: FAMILY MEDICINE CLINIC | Facility: CLINIC | Age: 55
End: 2025-02-10
Payer: COMMERCIAL

## 2025-02-10 DIAGNOSIS — R73.9 HYPERGLYCEMIA: ICD-10-CM

## 2025-02-10 DIAGNOSIS — E03.9 HYPOTHYROIDISM (ACQUIRED): ICD-10-CM

## 2025-02-10 DIAGNOSIS — E55.9 VITAMIN D DEFICIENCY: ICD-10-CM

## 2025-02-10 DIAGNOSIS — E78.2 HYPERLIPIDEMIA, MIXED: ICD-10-CM

## 2025-02-10 DIAGNOSIS — C81.91 HODGKIN LYMPHOMA OF LYMPH NODES OF NECK, UNSPECIFIED HODGKIN LYMPHOMA TYPE: ICD-10-CM

## 2025-02-10 DIAGNOSIS — C81.91 HODGKIN LYMPHOMA OF LYMPH NODES OF NECK, UNSPECIFIED HODGKIN LYMPHOMA TYPE: Primary | ICD-10-CM

## 2025-02-11 LAB
25(OH)D3+25(OH)D2 SERPL-MCNC: 28 NG/ML (ref 30–100)
ALBUMIN SERPL-MCNC: 4.5 G/DL (ref 3.8–4.9)
ALP SERPL-CCNC: 108 IU/L (ref 44–121)
ALT SERPL-CCNC: 12 IU/L (ref 0–32)
AST SERPL-CCNC: 17 IU/L (ref 0–40)
BASOPHILS # BLD AUTO: 0.1 X10E3/UL (ref 0–0.2)
BASOPHILS NFR BLD AUTO: 1 %
BILIRUB SERPL-MCNC: 0.4 MG/DL (ref 0–1.2)
BUN SERPL-MCNC: 13 MG/DL (ref 6–24)
BUN/CREAT SERPL: 17 (ref 9–23)
CALCIUM SERPL-MCNC: 9.5 MG/DL (ref 8.7–10.2)
CHLORIDE SERPL-SCNC: 102 MMOL/L (ref 96–106)
CHOLEST SERPL-MCNC: 262 MG/DL (ref 100–199)
CO2 SERPL-SCNC: 22 MMOL/L (ref 20–29)
CREAT SERPL-MCNC: 0.76 MG/DL (ref 0.57–1)
EGFRCR SERPLBLD CKD-EPI 2021: 94 ML/MIN/1.73
EOSINOPHIL # BLD AUTO: 0.4 X10E3/UL (ref 0–0.4)
EOSINOPHIL NFR BLD AUTO: 6 %
ERYTHROCYTE [DISTWIDTH] IN BLOOD BY AUTOMATED COUNT: 13.3 % (ref 11.7–15.4)
GLOBULIN SER CALC-MCNC: 3.1 G/DL (ref 1.5–4.5)
GLUCOSE SERPL-MCNC: 101 MG/DL (ref 70–99)
HBA1C MFR BLD: 5.7 % (ref 4.8–5.6)
HCT VFR BLD AUTO: 47.7 % (ref 34–46.6)
HDLC SERPL-MCNC: 89 MG/DL
HGB BLD-MCNC: 15 G/DL (ref 11.1–15.9)
IMM GRANULOCYTES # BLD AUTO: 0 X10E3/UL (ref 0–0.1)
IMM GRANULOCYTES NFR BLD AUTO: 0 %
LDLC SERPL CALC-MCNC: 158 MG/DL (ref 0–99)
LYMPHOCYTES # BLD AUTO: 2.2 X10E3/UL (ref 0.7–3.1)
LYMPHOCYTES NFR BLD AUTO: 31 %
MCH RBC QN AUTO: 29.5 PG (ref 26.6–33)
MCHC RBC AUTO-ENTMCNC: 31.4 G/DL (ref 31.5–35.7)
MCV RBC AUTO: 94 FL (ref 79–97)
MONOCYTES # BLD AUTO: 0.4 X10E3/UL (ref 0.1–0.9)
MONOCYTES NFR BLD AUTO: 6 %
NEUTROPHILS # BLD AUTO: 4.1 X10E3/UL (ref 1.4–7)
NEUTROPHILS NFR BLD AUTO: 56 %
PLATELET # BLD AUTO: 383 X10E3/UL (ref 150–450)
POTASSIUM SERPL-SCNC: 4.2 MMOL/L (ref 3.5–5.2)
PROT SERPL-MCNC: 7.6 G/DL (ref 6–8.5)
RBC # BLD AUTO: 5.09 X10E6/UL (ref 3.77–5.28)
SODIUM SERPL-SCNC: 140 MMOL/L (ref 134–144)
T4 FREE SERPL-MCNC: 1.11 NG/DL (ref 0.82–1.77)
TRIGL SERPL-MCNC: 91 MG/DL (ref 0–149)
TSH SERPL DL<=0.005 MIU/L-ACNC: 2.06 UIU/ML (ref 0.45–4.5)
VLDLC SERPL CALC-MCNC: 15 MG/DL (ref 5–40)
WBC # BLD AUTO: 7.3 X10E3/UL (ref 3.4–10.8)

## 2025-02-12 ENCOUNTER — PATIENT MESSAGE (OUTPATIENT)
Dept: FAMILY MEDICINE CLINIC | Facility: CLINIC | Age: 55
End: 2025-02-12
Payer: COMMERCIAL

## 2025-02-12 DIAGNOSIS — E78.2 HYPERLIPIDEMIA, MIXED: Primary | ICD-10-CM

## 2025-02-12 DIAGNOSIS — E03.9 HYPOTHYROIDISM (ACQUIRED): ICD-10-CM

## 2025-02-12 DIAGNOSIS — E55.9 VITAMIN D DEFICIENCY: ICD-10-CM

## 2025-02-12 DIAGNOSIS — R73.9 HYPERGLYCEMIA: ICD-10-CM

## 2025-02-12 RX ORDER — ROSUVASTATIN CALCIUM 5 MG/1
5 TABLET, COATED ORAL DAILY
Qty: 90 TABLET | Refills: 2 | Status: SHIPPED | OUTPATIENT
Start: 2025-02-12

## 2025-03-03 RX ORDER — LEVOTHYROXINE SODIUM 50 MCG
50 TABLET ORAL DAILY
Qty: 90 TABLET | Refills: 0 | Status: SHIPPED | OUTPATIENT
Start: 2025-03-03

## 2025-03-03 NOTE — TELEPHONE ENCOUNTER
Rx Refill Note  Requested Prescriptions     Pending Prescriptions Disp Refills    Synthroid 50 MCG tablet [Pharmacy Med Name: SYNTHROID 50 MCG TABLET] 90 tablet 0     Sig: TAKE 1 TABLET BY MOUTH DAILY      Last office visit with prescribing clinician: 3/26/2024   Last telemedicine visit with prescribing clinician: Visit date not found   Next office visit with prescribing clinician: 3/26/2025                         Would you like a call back once the refill request has been completed: [] Yes [] No    If the office needs to give you a call back, can they leave a voicemail: [] Yes [] No    Tran Chaudhry MA  03/03/25, 10:29 EST

## 2025-03-26 ENCOUNTER — OFFICE VISIT (OUTPATIENT)
Dept: ENDOCRINOLOGY | Facility: CLINIC | Age: 55
End: 2025-03-26
Payer: COMMERCIAL

## 2025-03-26 VITALS
SYSTOLIC BLOOD PRESSURE: 112 MMHG | DIASTOLIC BLOOD PRESSURE: 72 MMHG | OXYGEN SATURATION: 99 % | WEIGHT: 114 LBS | HEART RATE: 80 BPM | BODY MASS INDEX: 20.98 KG/M2 | HEIGHT: 62 IN

## 2025-03-26 DIAGNOSIS — E03.9 HYPOTHYROIDISM (ACQUIRED): Primary | ICD-10-CM

## 2025-03-26 DIAGNOSIS — E04.9 NODULAR GOITER: ICD-10-CM

## 2025-03-26 PROCEDURE — 99213 OFFICE O/P EST LOW 20 MIN: CPT | Performed by: INTERNAL MEDICINE

## 2025-03-26 RX ORDER — LEVOTHYROXINE SODIUM 50 MCG
50 TABLET ORAL DAILY
Qty: 90 TABLET | Refills: 3 | Status: SHIPPED | OUTPATIENT
Start: 2025-03-26

## 2025-03-26 NOTE — PROGRESS NOTES
"     Office Note      Date: 2025  Patient Name: Concetta Davey  MRN: [id number]  : 1970    Chief Complaint   Patient presents with    Hypothyroidism    Goiter     Nodular  Thyroid U/S around 2026    Vitamin D Deficiency       History of Present Illness:   Concetta Davey is a 54 y.o. female who presents for Hypothyroidism, Goiter (Nodular/Thyroid U/S around 2026), and Vitamin D Deficiency    She remains on synthroid 50mcg qd. She is taking this correctly. She isn't taking any interfering meds concurrently. She hasn't noted any change in the size of her neck. She notes occ trouble swallowing, especially with meat. She has noted occ hoarseness. She denies any sxs of hypo- or hyperthyroidism.      Nodular goiter - benign FNA ; 10/16    Subjective      Review of Systems:   Review of Systems   Constitutional: Negative.    Cardiovascular: Negative.    Gastrointestinal: Negative.    Endocrine: Negative.        The following portions of the patient's history were reviewed and updated as appropriate: allergies, current medications, past family history, past medical history, past social history, past surgical history, and problem list.    Objective     Visit Vitals  /72 (BP Location: Left arm, Patient Position: Sitting, Cuff Size: Adult)   Pulse 80   Ht 157.5 cm (62.01\")   Wt 51.7 kg (114 lb)   SpO2 99%   BMI 20.85 kg/m²       Physical Exam:  Physical Exam  Constitutional:       Appearance: Normal appearance.   Neck:      Thyroid: Thyroid mass and thyromegaly present. No thyroid tenderness.      Comments: 4cm enlargement of right thyroid lobe - freely movable  Lymphadenopathy:      Cervical: No cervical adenopathy.   Neurological:      Mental Status: She is alert.         Labs:    TSH  No results found for: \"TSHBASE\"     Free T4  Free T4   Date Value Ref Range Status   02/10/2025 1.11 0.82 - 1.77 ng/dL Final       T3  No results found for: \"P6JNCWH\"      TPO  No results found for: " "\"THYROIDAB\"    TG AB  No results found for: \"THGAB\"    TG  No results found for: \"THYROGLB\"    CBC w/DIFF  Lab Results   Component Value Date    WBC 7.3 02/10/2025    RBC 5.09 02/10/2025    HGB 15.0 02/10/2025    HCT 47.7 (H) 02/10/2025    MCV 94 02/10/2025    MCH 29.5 02/10/2025    MCHC 31.4 (L) 02/10/2025    RDW 13.3 02/10/2025     02/10/2025    NEUTRORELPCT 56 02/10/2025    LYMPHORELPCT 31 02/10/2025    MONORELPCT 6 02/10/2025    EOSRELPCT 6 02/10/2025    BASORELPCT 1 02/10/2025    NEUTROABS 4.1 02/10/2025    LYMPHSABS 2.2 02/10/2025    MONOSABS 0.4 02/10/2025    EOSABS 0.4 02/10/2025    BASOSABS 0.1 02/10/2025           Assessment / Plan      Assessment & Plan:  Diagnoses and all orders for this visit:    1. Hypothyroidism (acquired) (Primary)  Assessment & Plan:  Continue Synthroid.  Recent TSH was good at 2.06.      2. Nodular goiter  Assessment & Plan:  Neck exam is stable.  Neck u/s was stable last visit.  Plan for another u/s in a year.      Other orders  -     Synthroid 50 MCG tablet; Take 1 tablet by mouth Daily.  Dispense: 90 tablet; Refill: 3      Current Outpatient Medications   Medication Instructions    rosuvastatin (CRESTOR) 5 mg, Oral, Daily, For cholesterol control and heart protection    Synthroid 50 mcg, Oral, Daily    Vitamin D3 4,000 Units, Daily      Return in about 1 year (around 3/26/2026) for Recheck with TSH, neck u/s.    Electronically signed by: Armin Maya MD  03/26/2025  "

## 2025-08-06 LAB
25(OH)D3+25(OH)D2 SERPL-MCNC: 40.2 NG/ML (ref 30–100)
ALBUMIN SERPL-MCNC: 4.5 G/DL (ref 3.5–5.2)
ALBUMIN/GLOB SERPL: 1.6 G/DL
ALP SERPL-CCNC: 86 U/L (ref 39–117)
ALT SERPL-CCNC: 11 U/L (ref 1–33)
AST SERPL-CCNC: 18 U/L (ref 1–32)
BASOPHILS # BLD AUTO: 0.08 10*3/MM3 (ref 0–0.2)
BASOPHILS NFR BLD AUTO: 1.1 % (ref 0–1.5)
BILIRUB SERPL-MCNC: 0.4 MG/DL (ref 0–1.2)
BUN SERPL-MCNC: 13 MG/DL (ref 6–20)
BUN/CREAT SERPL: 18.1 (ref 7–25)
CALCIUM SERPL-MCNC: 9.4 MG/DL (ref 8.6–10.5)
CHLORIDE SERPL-SCNC: 105 MMOL/L (ref 98–107)
CHOLEST SERPL-MCNC: 159 MG/DL (ref 0–200)
CO2 SERPL-SCNC: 27.9 MMOL/L (ref 22–29)
CREAT SERPL-MCNC: 0.72 MG/DL (ref 0.57–1)
EGFRCR SERPLBLD CKD-EPI 2021: 99.5 ML/MIN/1.73
EOSINOPHIL # BLD AUTO: 0.38 10*3/MM3 (ref 0–0.4)
EOSINOPHIL NFR BLD AUTO: 5.1 % (ref 0.3–6.2)
ERYTHROCYTE [DISTWIDTH] IN BLOOD BY AUTOMATED COUNT: 12.8 % (ref 12.3–15.4)
GLOBULIN SER CALC-MCNC: 2.9 GM/DL
GLUCOSE SERPL-MCNC: 86 MG/DL (ref 65–99)
HBA1C MFR BLD: 5.5 % (ref 4.8–5.6)
HCT VFR BLD AUTO: 42.9 % (ref 34–46.6)
HDLC SERPL-MCNC: 82 MG/DL (ref 40–60)
HGB BLD-MCNC: 14.2 G/DL (ref 12–15.9)
IMM GRANULOCYTES # BLD AUTO: 0.01 10*3/MM3 (ref 0–0.05)
IMM GRANULOCYTES NFR BLD AUTO: 0.1 % (ref 0–0.5)
LDLC SERPL CALC-MCNC: 64 MG/DL (ref 0–100)
LYMPHOCYTES # BLD AUTO: 1.81 10*3/MM3 (ref 0.7–3.1)
LYMPHOCYTES NFR BLD AUTO: 24.1 % (ref 19.6–45.3)
MCH RBC QN AUTO: 30.6 PG (ref 26.6–33)
MCHC RBC AUTO-ENTMCNC: 33.1 G/DL (ref 31.5–35.7)
MCV RBC AUTO: 92.5 FL (ref 79–97)
MONOCYTES # BLD AUTO: 0.57 10*3/MM3 (ref 0.1–0.9)
MONOCYTES NFR BLD AUTO: 7.6 % (ref 5–12)
NEUTROPHILS # BLD AUTO: 4.66 10*3/MM3 (ref 1.7–7)
NEUTROPHILS NFR BLD AUTO: 62 % (ref 42.7–76)
NRBC BLD AUTO-RTO: 0 /100 WBC (ref 0–0.2)
PLATELET # BLD AUTO: 324 10*3/MM3 (ref 140–450)
POTASSIUM SERPL-SCNC: 4.2 MMOL/L (ref 3.5–5.2)
PROT SERPL-MCNC: 7.4 G/DL (ref 6–8.5)
RBC # BLD AUTO: 4.64 10*6/MM3 (ref 3.77–5.28)
SODIUM SERPL-SCNC: 141 MMOL/L (ref 136–145)
T4 FREE SERPL-MCNC: 1.22 NG/DL (ref 0.92–1.68)
TRIGL SERPL-MCNC: 69 MG/DL (ref 0–150)
TSH SERPL DL<=0.005 MIU/L-ACNC: 1.28 UIU/ML (ref 0.27–4.2)
VLDLC SERPL CALC-MCNC: 13 MG/DL (ref 5–40)
WBC # BLD AUTO: 7.51 10*3/MM3 (ref 3.4–10.8)

## 2025-08-19 ENCOUNTER — OFFICE VISIT (OUTPATIENT)
Dept: FAMILY MEDICINE CLINIC | Facility: CLINIC | Age: 55
End: 2025-08-19
Payer: COMMERCIAL

## 2025-08-19 VITALS
DIASTOLIC BLOOD PRESSURE: 74 MMHG | SYSTOLIC BLOOD PRESSURE: 122 MMHG | HEART RATE: 95 BPM | WEIGHT: 113.1 LBS | HEIGHT: 62 IN | OXYGEN SATURATION: 99 % | BODY MASS INDEX: 20.81 KG/M2

## 2025-08-19 DIAGNOSIS — C81.91 HODGKIN LYMPHOMA OF LYMPH NODES OF NECK, UNSPECIFIED HODGKIN LYMPHOMA TYPE: ICD-10-CM

## 2025-08-19 DIAGNOSIS — E78.2 HYPERLIPIDEMIA, MIXED: ICD-10-CM

## 2025-08-19 DIAGNOSIS — G24.8 ABDOMINAL WALL DYSTONIA: Primary | ICD-10-CM

## 2025-08-19 DIAGNOSIS — E03.9 HYPOTHYROIDISM (ACQUIRED): ICD-10-CM

## 2025-08-19 PROCEDURE — 99214 OFFICE O/P EST MOD 30 MIN: CPT | Performed by: FAMILY MEDICINE

## 2025-08-19 RX ORDER — BACLOFEN 10 MG/1
5-10 TABLET ORAL 3 TIMES DAILY PRN
Qty: 30 TABLET | Refills: 3 | Status: SHIPPED | OUTPATIENT
Start: 2025-08-19

## 2025-08-19 RX ORDER — ROSUVASTATIN CALCIUM 5 MG/1
5 TABLET, COATED ORAL DAILY
Qty: 90 TABLET | Refills: 2 | Status: SHIPPED | OUTPATIENT
Start: 2025-08-19